# Patient Record
Sex: MALE | Race: WHITE | NOT HISPANIC OR LATINO | Employment: STUDENT | URBAN - METROPOLITAN AREA
[De-identification: names, ages, dates, MRNs, and addresses within clinical notes are randomized per-mention and may not be internally consistent; named-entity substitution may affect disease eponyms.]

---
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
## 2025-05-12 ENCOUNTER — HOSPITAL ENCOUNTER (EMERGENCY)
Facility: HOSPITAL | Age: 20
Discharge: HOME | End: 2025-05-12
Attending: EMERGENCY MEDICINE
Payer: COMMERCIAL

## 2025-05-12 VITALS
TEMPERATURE: 97 F | HEART RATE: 84 BPM | OXYGEN SATURATION: 96 % | WEIGHT: 160 LBS | RESPIRATION RATE: 18 BRPM | DIASTOLIC BLOOD PRESSURE: 77 MMHG | SYSTOLIC BLOOD PRESSURE: 134 MMHG

## 2025-05-12 DIAGNOSIS — K64.9 HEMORRHOID: Primary | ICD-10-CM

## 2025-05-12 PROCEDURE — NC001 PR NO CHARGE: Performed by: INTERNAL MEDICINE

## 2025-05-12 PROCEDURE — 99282 EMERGENCY DEPT VISIT SF MDM: CPT

## 2025-05-12 PROCEDURE — 96372 THER/PROPH/DIAG INJ SC/IM: CPT

## 2025-05-12 PROCEDURE — 99284 EMERGENCY DEPT VISIT MOD MDM: CPT

## 2025-05-12 RX ORDER — KETOROLAC TROMETHAMINE 30 MG/ML
15 INJECTION, SOLUTION INTRAMUSCULAR; INTRAVENOUS ONCE
Status: COMPLETED | OUTPATIENT
Start: 2025-05-12 | End: 2025-05-12

## 2025-05-12 RX ORDER — ACETAMINOPHEN 325 MG/1
975 TABLET ORAL ONCE
Status: COMPLETED | OUTPATIENT
Start: 2025-05-12 | End: 2025-05-12

## 2025-05-12 RX ORDER — HYDROCORTISONE ACETATE PRAMOXINE HCL 2.5; 1 G/100G; G/100G
CREAM TOPICAL 3 TIMES DAILY
Qty: 30 G | Refills: 0 | Status: SHIPPED | OUTPATIENT
Start: 2025-05-12

## 2025-05-12 RX ADMIN — KETOROLAC TROMETHAMINE 15 MG: 30 INJECTION, SOLUTION INTRAMUSCULAR at 16:18

## 2025-05-12 RX ADMIN — ACETAMINOPHEN 975 MG: 325 TABLET ORAL at 16:18

## 2025-05-12 NOTE — ED PROVIDER NOTES
ED Disposition       None          Assessment & Plan       MDM         Medications - No data to display    ED Risk Strat Scores                    No data recorded                            History of Present Illness       Chief Complaint   Patient presents with    Hemorrhoids       Past Medical History:   Diagnosis Date    Asthma       No past surgical history on file.   No family history on file.   Social History     Tobacco Use    Smoking status: Never    Smokeless tobacco: Never   Vaping Use    Vaping status: Never Used      E-Cigarette/Vaping    E-Cigarette Use Never User       E-Cigarette/Vaping Substances      I have reviewed and agree with the history as documented.     HPI    Review of Systems        Objective       ED Triage Vitals   Temp Pulse BP Resp SpO2 Patient Position - Orthostatic VS   -- -- -- -- -- --      Temp src Heart Rate Source BP Location FiO2 (%) Pain Score    -- -- -- -- --      Vitals    None         Physical Exam    Results Reviewed       None            No orders to display       Procedures    ED Medication and Procedure Management   Prior to Admission Medications   Prescriptions Last Dose Informant Patient Reported? Taking?   Multiple Vitamin (DAILY VALUE MULTIVITAMIN) TABS   Yes No   Sig: Take by mouth      Facility-Administered Medications: None     Patient's Medications   Discharge Prescriptions    No medications on file     No discharge procedures on file.  ED SEPSIS DOCUMENTATION            Jermaine Ku MD  05/12/25 8877

## 2025-05-12 NOTE — ED PROVIDER NOTES
"Time reflects when diagnosis was documented in both MDM as applicable and the Disposition within this note       Time User Action Codes Description Comment    5/12/2025  4:11 PM DawitJoo Add [K64.9] Hemorrhoid           ED Disposition       ED Disposition   Discharge    Condition   Stable    Date/Time   Mon May 12, 2025  4:11 PM    Comment   Abdulkadir WALKER Hobbs discharge to home/self care.                   Assessment & Plan       Medical Decision Making  Patient is a 19-year-old male with a PMH of asthma presenting the ER complaining of rectal pain after hard bowel movement yesterday morning.  Patient states he did not try anything for symptoms at home.  VS stable, physical exam revealing external hemorrhoid.  Abdomen soft and nontender to palpation.  Remainder physical exam is benign.    Hemorrhoid is not thrombosed, no obvious rectal bleeding noted. Discussed case with attending physician who evaluated patient bedside. Will treat with proctofoam/metamucil. Recommended patient follow up with general surgery as needed. Recommended patient continue to take tylenol/motrin as needed for pain and apply proctofoam to area daily. Recommended patient take metamucil to help soften stools. Discussed return precautions with patient who expressed verbal understanding.  Patient is agreeable with plan and is stable at time of discharge.    Risk  OTC drugs.  Prescription drug management.             Medications   acetaminophen (TYLENOL) tablet 975 mg (975 mg Oral Given 5/12/25 1618)   ketorolac (TORADOL) injection 15 mg (15 mg Intramuscular Given 5/12/25 1618)       ED Risk Strat Scores              CRAFFT      Flowsheet Row Most Recent Value   CRAFFT Initial Screen: During the past 12 months, did you:    1. Drink any alcohol (more than a few sips)?  No Filed at: 05/12/2025 5294   2. Smoke any marijuana or hashish No Filed at: 05/12/2025 6569   3. Use anything else to get high? (\"anything else\" includes illegal drugs, over the " counter and prescription drugs, and things that you sniff or 'antony')? No Filed at: 05/12/2025 1557              No data recorded                            History of Present Illness       Chief Complaint   Patient presents with    Hemorrhoids       Past Medical History:   Diagnosis Date    Asthma       History reviewed. No pertinent surgical history.   History reviewed. No pertinent family history.   Social History     Tobacco Use    Smoking status: Never    Smokeless tobacco: Never   Vaping Use    Vaping status: Every Day    Substances: Nicotine   Substance Use Topics    Alcohol use: Not Currently    Drug use: Not Currently      E-Cigarette/Vaping    E-Cigarette Use Current Every Day User       E-Cigarette/Vaping Substances    Nicotine Yes       I have reviewed and agree with the history as documented.     Patient is a 19-year-old male with a PMH of asthma presenting the ER complaining of rectal pain after hard bowel movement yesterday morning.  Patient reports pain started immediately after bowel movement, denies rectal bleeding.  Patient reports pain was constant, states he did not try anything at home for pain.  Patient reports pain is worsened with movement and with bowel movements.  Patient denies abdominal pain, dysuria, testicular pain/swelling, penile discharge, bloody/black stools, N/V.          Review of Systems   Constitutional:  Negative for chills and fever.   HENT:  Negative for ear pain and sore throat.    Eyes:  Negative for pain and visual disturbance.   Respiratory:  Negative for cough and shortness of breath.    Cardiovascular:  Negative for chest pain and palpitations.   Gastrointestinal:  Positive for constipation and rectal pain. Negative for abdominal pain, blood in stool, diarrhea, nausea and vomiting.   Genitourinary:  Negative for dysuria, genital sores, hematuria, penile discharge, penile swelling and testicular pain.   Musculoskeletal:  Negative for arthralgias and back pain.   Skin:   Negative for color change and rash.   Neurological:  Negative for seizures and syncope.   All other systems reviewed and are negative.          Objective       ED Triage Vitals [05/12/25 1556]   Temperature Pulse Blood Pressure Respirations SpO2 Patient Position - Orthostatic VS   99.2 °F (37.3 °C) 104 137/85 18 98 % Sitting      Temp Source Heart Rate Source BP Location FiO2 (%) Pain Score    Temporal Monitor Left arm -- 8      Vitals      Date and Time Temp Pulse SpO2 Resp BP Pain Score FACES Pain Rating User   05/12/25 1620 97 °F (36.1 °C) 84 96 % 18 134/77 -- -- EM   05/12/25 1618 -- -- -- -- -- 8 -- EM   05/12/25 1556 99.2 °F (37.3 °C) 104 98 % 18 137/85 8 -- DK            Physical Exam  Vitals and nursing note reviewed. Exam conducted with a chaperone present.   Constitutional:       General: He is not in acute distress.     Appearance: Normal appearance. He is well-developed. He is not ill-appearing.   HENT:      Head: Normocephalic and atraumatic.   Eyes:      Conjunctiva/sclera: Conjunctivae normal.   Cardiovascular:      Rate and Rhythm: Normal rate and regular rhythm.      Heart sounds: Normal heart sounds. No murmur heard.     No friction rub.   Pulmonary:      Effort: Pulmonary effort is normal. No respiratory distress.      Breath sounds: Normal breath sounds. No wheezing.   Abdominal:      General: Abdomen is flat. There is no distension.      Palpations: Abdomen is soft.      Tenderness: There is no abdominal tenderness. There is no guarding.   Genitourinary:     Rectum: External hemorrhoid present.      Comments: 1 cm external hemorrhoid noted, no obvious bleeding noted. Hemorrhoid is not thrombosed.   Musculoskeletal:         General: No swelling.      Cervical back: Neck supple.   Skin:     General: Skin is warm and dry.      Capillary Refill: Capillary refill takes less than 2 seconds.   Neurological:      Mental Status: He is alert.   Psychiatric:         Mood and Affect: Mood normal.          Results Reviewed       None            No orders to display       Procedures    ED Medication and Procedure Management   Prior to Admission Medications   Prescriptions Last Dose Informant Patient Reported? Taking?   Multiple Vitamin (DAILY VALUE MULTIVITAMIN) TABS   Yes No   Sig: Take by mouth      Facility-Administered Medications: None     Discharge Medication List as of 5/12/2025  4:50 PM        START taking these medications    Details   hydrocortisone-pramoxine (ANALPRAM-HC) 2.5-1 % rectal cream Apply topically 3 (three) times a day, Starting Mon 5/12/2025, Normal      psyllium (METAMUCIL) 58.6 % powder Take 1 packet by mouth 3 (three) times a day, Starting Mon 5/12/2025, Normal           CONTINUE these medications which have NOT CHANGED    Details   Multiple Vitamin (DAILY VALUE MULTIVITAMIN) TABS Take by mouth, Historical Med             ED SEPSIS DOCUMENTATION   Time reflects when diagnosis was documented in both MDM as applicable and the Disposition within this note       Time User Action Codes Description Comment    5/12/2025  4:11 PM Joo Pastor Add [K64.9] Hemorrhoid                  Joo Pastor PA-C  05/12/25 2006

## 2025-05-13 NOTE — PROGRESS NOTES
Name: Abdulkadir Hobbs      : 2005      MRN: [id number]  Encounter Provider: Panda Jauregui PA-C  Encounter Date: 2025   Encounter department: [facility] [place]  :  Assessment & Plan  Other hemorrhoids  Presents with grade III right anterior hemorrhoid. Had a hard constipated bowel movement due to use of Oxycodone following orthopedic surgery on his left  hand. Currently no longer using Oxycodone. Since ER visit hemorrhoid has decreased in size, pain improving, able to sit comfortably, has had a softer BM without new bleeding. On exam there is a 1 cm external hemorrhoid present that does not appear thrombosed. Advised he continue including fiber  in his diet with increased water intake, incorporate a stool softener, and continue topical cream. As his constipation subsides will expect hemorrhoid to resolve completely. Advised that if hemorrhoid persists or symptoms progress to call and come back in for evaluation. Currently no role for procedure or surgery at this time.             History of Present Illness   HPI  Abdulkadir Hobbs is a 19 y.o. male who presents with rectal pain/hemorrhoids   History obtained from: patient    Review of Systems   All other systems reviewed and are negative.    Past Medical History   Past Medical History:   Diagnosis Date    Asthma      No past surgical history on file.  No family history on file.   reports that he has never smoked. He has never used smokeless tobacco. He reports that he does not currently use alcohol. He reports that he does not currently use drugs.  Current Outpatient Medications   Medication Instructions    hydrocortisone-pramoxine (ANALPRAM-HC) 2.5-1 % rectal cream Topical, 3 times daily    Multiple Vitamin (DAILY VALUE MULTIVITAMIN) TABS Take by mouth    psyllium (METAMUCIL) 58.6 % powder 1 packet, Oral, 3 times daily   Allergies[1]      Objective   /84   Pulse 98   Temp (!) 96.8 °F (36 °C) (Temporal)   Resp 18   Ht  "6' 3\" (1.905 m)   Wt 69.4 kg (153 lb)   SpO2 99%   BMI 19.12 kg/m²      Physical Exam  Vitals and nursing note reviewed.   Constitutional:       General: He is not in acute distress.     Appearance: He is well-developed. He is not diaphoretic.   HENT:      Head: Normocephalic and atraumatic.     Eyes:      Conjunctiva/sclera: Conjunctivae normal.      Pupils: Pupils are equal, round, and reactive to light.     Pulmonary:      Effort: No respiratory distress.   Genitourinary:     Comments: External hemorrhoid, inflamed without thrombosis.    Musculoskeletal:         General: Normal range of motion.      Cervical back: Normal range of motion.     Skin:     General: Skin is warm and dry.      Capillary Refill: Capillary refill takes less than 2 seconds.     Neurological:      Mental Status: He is alert and oriented to person, place, and time.     Psychiatric:         Behavior: Behavior normal.                [1] No Known Allergies    "

## 2025-05-14 ENCOUNTER — CONSULT (OUTPATIENT)
Dept: SURGERY | Facility: CLINIC | Age: 20
End: 2025-05-14
Payer: COMMERCIAL

## 2025-05-14 VITALS
SYSTOLIC BLOOD PRESSURE: 128 MMHG | WEIGHT: 153 LBS | HEIGHT: 75 IN | BODY MASS INDEX: 19.02 KG/M2 | DIASTOLIC BLOOD PRESSURE: 84 MMHG | HEART RATE: 98 BPM | RESPIRATION RATE: 18 BRPM | OXYGEN SATURATION: 99 % | TEMPERATURE: 96.8 F

## 2025-05-14 DIAGNOSIS — K64.8 OTHER HEMORRHOIDS: Primary | ICD-10-CM

## 2025-05-14 PROCEDURE — 99203 OFFICE O/P NEW LOW 30 MIN: CPT | Performed by: PHYSICIAN ASSISTANT

## 2025-05-14 NOTE — PATIENT INSTRUCTIONS
Colace (stool softener) 2 tablets at bedtime x 1 week.  Increase dietary fiber with fruit, vegetable, nuts/seeds, whole grains, but include more water intake as well. Hold off on fiber supplements for now.  Continue Preparation H until hemorrhoid resolves.  Use Miralax according to label instructions if constipation worsens.  Can also do warm soaks with magnesium salts to sooth discomfort (Sitz bath).

## 2025-05-14 NOTE — ASSESSMENT & PLAN NOTE
Presents with grade III right anterior hemorrhoid. Had a hard constipated bowel movement due to use of Oxycodone following orthopedic surgery on his left  hand. Currently no longer using Oxycodone. Since ER visit hemorrhoid has decreased in size, pain improving, able to sit comfortably, has had a softer BM without new bleeding. On exam there is a 1 cm external hemorrhoid present that does not appear thrombosed. Advised he continue including fiber  in his diet with increased water intake, incorporate a stool softener, and continue topical cream. As his constipation subsides will expect hemorrhoid to resolve completely. Advised that if hemorrhoid persists or symptoms progress to call and come back in for evaluation. Currently no role for procedure or surgery at this time.